# Patient Record
Sex: FEMALE | Race: BLACK OR AFRICAN AMERICAN | NOT HISPANIC OR LATINO | Employment: STUDENT | URBAN - METROPOLITAN AREA
[De-identification: names, ages, dates, MRNs, and addresses within clinical notes are randomized per-mention and may not be internally consistent; named-entity substitution may affect disease eponyms.]

---
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
## 2025-04-29 ENCOUNTER — TELEPHONE (OUTPATIENT)
Dept: ALLERGY | Facility: CLINIC | Age: 20
End: 2025-04-29
Payer: MEDICAID

## 2025-04-29 NOTE — TELEPHONE ENCOUNTER
----- Message from Shelby sent at 4/28/2025  2:28 PM CDT -----  Contact: [phone]  Would like to receive medical advice.Symptoms (please be specific):  both eyes close How long has the patient had these symptoms:  yesterdayAny drug allergies (copy/paste from chart): Mupirocin  Pharmacy name/number (copy/paste from chart):  [id number] - [place], [place] - [address] [facility] AT [address] [address]Phmvr: [phone] Fax: [phone] they like a call back or a response via [facility]:  callAdditional information:  Please call to advise.

## 2025-04-29 NOTE — TELEPHONE ENCOUNTER
Unable to get in contact with patient.  Subscriber is not available.  Left a voice message to call our office at her convenience for an appointment with Dr Lazo.

## 2025-04-29 NOTE — TELEPHONE ENCOUNTER
----- Message from Elin sent at 4/29/2025  4:00 PM CDT -----  Contact: Porsche (Mother  Returning a Missed CallCaller:  Elba (Mother)Returning call to :  Radha can be reached @:  [phone] of the call: Missed Call  Please call back

## 2025-04-29 NOTE — TELEPHONE ENCOUNTER
----- Message from Zaira sent at 4/29/2025  2:35 PM CDT -----  Regarding: appt  .Type:  Needs Medical AdviceWho Called: mom Denisse Symptoms (please be specific): asking for appt today or tomorrow for pt have such bad eczema she cannot see  . Attempted to schedule, no access till June. Caller wants pt seen ASAPWould the patient rather a call back or a response via [facility]? Call Best Call Back Number: [phone] Information: n/a

## 2025-05-07 ENCOUNTER — OFFICE VISIT (OUTPATIENT)
Dept: ALLERGY | Facility: CLINIC | Age: 20
End: 2025-05-07
Payer: MEDICAID

## 2025-05-07 VITALS — BODY MASS INDEX: 22.51 KG/M2 | HEIGHT: 65 IN | WEIGHT: 135.13 LBS

## 2025-05-07 DIAGNOSIS — J45.20 ASTHMA, INTERMITTENT, UNCOMPLICATED: ICD-10-CM

## 2025-05-07 DIAGNOSIS — L30.9 ECZEMA, UNSPECIFIED TYPE: Primary | ICD-10-CM

## 2025-05-07 DIAGNOSIS — J30.9 ALLERGIC RHINOCONJUNCTIVITIS OF BOTH EYES: ICD-10-CM

## 2025-05-07 DIAGNOSIS — H10.13 ALLERGIC RHINOCONJUNCTIVITIS OF BOTH EYES: ICD-10-CM

## 2025-05-07 PROCEDURE — 99214 OFFICE O/P EST MOD 30 MIN: CPT | Mod: S$PBB,,, | Performed by: ALLERGY & IMMUNOLOGY

## 2025-05-07 PROCEDURE — 1159F MED LIST DOCD IN RCRD: CPT | Mod: CPTII,,, | Performed by: ALLERGY & IMMUNOLOGY

## 2025-05-07 PROCEDURE — 3008F BODY MASS INDEX DOCD: CPT | Mod: CPTII,,, | Performed by: ALLERGY & IMMUNOLOGY

## 2025-05-07 PROCEDURE — 99213 OFFICE O/P EST LOW 20 MIN: CPT | Mod: PBBFAC | Performed by: ALLERGY & IMMUNOLOGY

## 2025-05-07 PROCEDURE — 99999 PR PBB SHADOW E&M-EST. PATIENT-LVL III: CPT | Mod: PBBFAC,,, | Performed by: ALLERGY & IMMUNOLOGY

## 2025-05-07 RX ORDER — ALBUTEROL SULFATE 90 UG/1
2 INHALANT RESPIRATORY (INHALATION) EVERY 4 HOURS PRN
Qty: 18 G | Refills: 3 | Status: SHIPPED | OUTPATIENT
Start: 2025-05-07

## 2025-05-07 RX ORDER — FEXOFENADINE HCL 180 MG/1
180 TABLET ORAL DAILY
Qty: 30 TABLET | Refills: 6 | Status: SHIPPED | OUTPATIENT
Start: 2025-05-07 | End: 2026-05-07

## 2025-05-07 RX ORDER — AZELASTINE HYDROCHLORIDE 0.5 MG/ML
1 SOLUTION/ DROPS OPHTHALMIC 2 TIMES DAILY
Qty: 6 ML | Refills: 6 | Status: SHIPPED | OUTPATIENT
Start: 2025-05-07 | End: 2025-06-06

## 2025-05-07 RX ORDER — TRIAMCINOLONE ACETONIDE 0.25 MG/G
CREAM TOPICAL 2 TIMES DAILY
Qty: 80 G | Refills: 3 | Status: SHIPPED | OUTPATIENT
Start: 2025-05-07

## 2025-05-07 NOTE — PROGRESS NOTES
Subjective:       Patient ID: Jackelin Douglas is a 19 y.o. female.     Chief Complaint: eczema        HPI    Pt w hx AR, asthma, eczema presents for recent eczema exacerbation.  About 5 weeks ago, upon returning to  from a week long trip to [place], started w eczematous changes on eyelids and around mouth, and popliteal fossae. No obvious trigger. Was originally rx'd elidel by primary care, but found elidel to be irritating. Uses a co-workers topical steroid (she's not sure which one) and found it helpful. 1% hydrocortisone was not helpful for eczema on arms.  Tolerates Vaseline for moisturization    Asthma has remained intermittent. Rare need albuterol. Last use was in Dec, w URI. Was given IM steroids then. But o/w no interval steroids for wheeze.    AR--pnd, nasal congestion, sneezing--controlled w prn zyrtec, but finds it sedating. Claritin not sedating but not helpful.  Has had recent itchy, puffy eyes.        Hx from 2/13/23:  Pt w hx allergic rhinoconjunctivitis, asthma, and eczema presents for treatment of allergic rhinoconjunctivitis sx's that have been worse over last 3-4 mo when spends more than 30 min outside  In recent years, asthma well controlled off ICS or ICS/LABA. Need for albuterol is rare, twice in last year.  Eczema controlled w cerave, prn topical steroid (rare need)          Past Medical History:   Diagnosis Date    Abnormal antibody titer       good response to pneumovax    Allergy, unspecified not elsewhere classified       immunocap positive for foods, negative for aeroallergens    AR (allergic rhinitis)      Asthma, not well controlled      Chest pain, unspecified      Cough      Eczema      Gestational age related disorder, 33-34 completed weeks      Ovarian cyst      Patient non adherence      Personal history of MRSA (methicillin resistant Staphylococcus aureus)      RSV (acute bronchiolitis due to respiratory syncytial virus)      Screening for cystic fibrosis       first sweat test  abnormal (60), repeat normal    Tonsillar and adenoid hypertrophy                 Family History   Problem Relation Age of Onset    Hypertension Mother      Breast cancer Mother 50    Asthma Father      Cancer Maternal Grandfather           Prostate    Parkinsonism Paternal Grandmother      Pancreatitis Paternal Grandfather      Leukemia Maternal Cousin      Colon cancer Neg Hx      Ovarian cancer Neg Hx         Review of Systems   Constitutional:  Negative for chills and fever.   HENT:  Negative for congestion, ear pain and sinus pain.    Eyes:  Negative for discharge.        Itchy eyes   Respiratory:  Negative for cough and wheezing.    Cardiovascular:  Negative for chest pain.   Gastrointestinal:  Negative for heartburn.   Skin:  Negative for rash.   Neurological:  Negative for headaches.            Objective:   Physical Exam  Vitals and nursing note reviewed.   Constitutional:       General: She is not in acute distress.     Appearance: She is well-developed.   HENT:      Head: Normocephalic.      Right Ear: Tympanic membrane and external ear normal.      Left Ear: Tympanic membrane and external ear normal.      Nose: No septal deviation, mucosal edema or rhinorrhea.      Right Sinus: No maxillary sinus tenderness or frontal sinus tenderness.      Left Sinus: No maxillary sinus tenderness or frontal sinus tenderness.      Mouth/Throat:      Pharynx: Uvula midline. No uvula swelling.   Eyes:      General:         Right eye: No discharge.         Left eye: No discharge.      Conjunctiva/sclera: Conjunctivae normal.   Cardiovascular:      Rate and Rhythm: Normal rate and regular rhythm.   Pulmonary:      Effort: Pulmonary effort is normal. No respiratory distress.      Breath sounds: Normal breath sounds. No wheezing.   Abdominal:      General: Bowel sounds are normal.      Palpations: Abdomen is soft.      Tenderness: There is no abdominal tenderness.   Musculoskeletal:         General: No tenderness. Normal range  of motion.      Cervical back: Normal range of motion and neck supple.   Lymphadenopathy:      Cervical: No cervical adenopathy.   Skin:     General: Skin is warm.      Findings: No erythema or rash.   Neurological:      Mental Status: She is alert and oriented to person, place, and time.   Psychiatric:         Behavior: Behavior normal.         Thought Content: Thought content normal.         Judgment: Judgment normal.                     2016  Adult panel inhalant skin testing:  3+ pos control  0+ neg control     4+ bahia, jinny, liat  3+ birch, box elder, oak, pecan, plantain, ragweed, samantha/dock, bermuda  2+ sweetgum, sycamore  Remainder negative             IgG   Date Value Ref Range Status   02/26/2016 682 650 - 1600 mg/dL Final       Comment:       IgG Cord Blood Reference Range: 650-1600 mg/dL.              IgM   Date Value Ref Range Status   02/26/2016 73 50 - 250 mg/dL Final       Comment:       IgM Cord Blood Reference Range: <25 mg/dL.              IgA   Date Value Ref Range Status   02/26/2016 43 (L) 45 - 250 mg/dL Final       Comment:       IgA Cord Blood Reference Range: <5 mg/dL.            IgE   Date Value Ref Range Status   02/26/2016 179 0 - 200 IU/mL Final            Eos, Fluid   Date Value Ref Range Status   07/25/2013 1 (A) % Final            Eos #   Date Value Ref Range Status   02/14/2020 0.1 0.0 - 0.4 K/uL Final   02/26/2016 0.4 0.0 - 0.5 K/uL Final            Eosinophil %   Date Value Ref Range Status   02/14/2020 1.1 0.0 - 4.0 % Final   02/26/2016 6.3 (H) 0.0 - 4.7 % Final   09/19/2011 3.2 0.0 - 4.5 % Final            IgE   Date Value Ref Range Status   02/26/2016 179 0 - 200 IU/mL Final   08/05/2011 119 35 - 250 IU/ml Final      Pneumococcal titers:        Results for orders placed or performed in visit on 02/26/16   S.pneumoniae IgG Serotypes   Result Value Ref Range     S.pneumoniae Type 1 3.3 mcg/mL     S.pneumoniae Type 3 14.5 mcg/mL     Strep pneumo Type 4 1.0 mcg/mL      S.pneumoniae Type 5 2.0 mcg/mL     S.pneumoniae Type 8 0.9 mcg/mL     S.pneumoniae Type 9N 1.1 mcg/mL     S.pneumoniae Type 12F 0.7 mcg/mL     Strep pneumo Type 14 0.6 mcg/mL     S.pneumoniae Type 19F 4.2 mcg/mL     S.pneumoniae Type 23F 2.6 mcg/mL     S.pneumoniae Type 6B 6.0 mcg/mL     S.pneumoniae Type 7F 2.4 mcg/mL     S.pneumoniae Type 18C 0.8 mcg/mL     S.pneumoniae Type 9V Abs 2.3 mcg/mL            Assessment:   Eczema, eyelid   Allergic rhinoconjunctivitis  Asthma, intermittent    Plan:       Routine moisturization  Prn tcn 0.025% cream to affected areas. Sparing use on face. Can escalate steroid potency or try protopic if no improvement  Prn optivar  Prn albuterol  Try fexofenadine instead of cetirizine prn

## (undated) DEVICE — OCCLUDER COLPO-PNEUMO STERILE

## (undated) DEVICE — TRAY FOLEY 16FR INFECTION CONT

## (undated) DEVICE — SEE MEDLINE ITEM 157181

## (undated) DEVICE — SUT MCRYL PLUS 4-0 PS2 27IN

## (undated) DEVICE — NDL INSUF ULTRA VERESS 120MM

## (undated) DEVICE — TROCAR KII FIOS 5MM X 100MM

## (undated) DEVICE — SOL NS 1000CC

## (undated) DEVICE — DRESSING LEUKOPLAST FLEX 1X3IN

## (undated) DEVICE — SEE MEDLINE ITEM 156952

## (undated) DEVICE — SYR 50CC LL

## (undated) DEVICE — JELLY LUBRICANT STERILE 4 OZ

## (undated) DEVICE — GLOVE BIOGEL PIMICRO INDIC 6.5

## (undated) DEVICE — TROCAR KII FIOS 11MM X 100MM

## (undated) DEVICE — DRAPE FLUID WARMER ORS 44X44IN

## (undated) DEVICE — DRAPE LAVH LAPAROSCOPY W/FLUID

## (undated) DEVICE — CLOSURE SKIN STERI STRIP 1/2X4

## (undated) DEVICE — GLOVE BIOGEL SKINSENSE PI 6.5

## (undated) DEVICE — SEE MEDLINE ITEM 146355

## (undated) DEVICE — ELECTRODE REM PLYHSV RETURN 9

## (undated) DEVICE — KIT WING PAD POSITIONING

## (undated) DEVICE — SOL 9P NACL IRR PIC IL